# Patient Record
Sex: MALE | Race: WHITE
[De-identification: names, ages, dates, MRNs, and addresses within clinical notes are randomized per-mention and may not be internally consistent; named-entity substitution may affect disease eponyms.]

---

## 2018-05-08 ENCOUNTER — HOSPITAL ENCOUNTER (INPATIENT)
Dept: HOSPITAL 36 - ER | Age: 53
LOS: 3 days | Discharge: NURSING, REHAB OR LONG-TERM CARE | DRG: 721 | End: 2018-05-11
Payer: COMMERCIAL

## 2018-05-08 DIAGNOSIS — E11.622: ICD-10-CM

## 2018-05-08 DIAGNOSIS — Z89.431: ICD-10-CM

## 2018-05-08 DIAGNOSIS — Z79.1: ICD-10-CM

## 2018-05-08 DIAGNOSIS — I11.9: ICD-10-CM

## 2018-05-08 DIAGNOSIS — E11.42: ICD-10-CM

## 2018-05-08 DIAGNOSIS — Z91.19: ICD-10-CM

## 2018-05-08 DIAGNOSIS — D63.8: ICD-10-CM

## 2018-05-08 DIAGNOSIS — Z89.021: ICD-10-CM

## 2018-05-08 DIAGNOSIS — Z79.899: ICD-10-CM

## 2018-05-08 DIAGNOSIS — M86.8X7: ICD-10-CM

## 2018-05-08 DIAGNOSIS — E11.69: ICD-10-CM

## 2018-05-08 DIAGNOSIS — K21.9: ICD-10-CM

## 2018-05-08 DIAGNOSIS — B96.1: ICD-10-CM

## 2018-05-08 DIAGNOSIS — Z79.2: ICD-10-CM

## 2018-05-08 DIAGNOSIS — E11.52: ICD-10-CM

## 2018-05-08 DIAGNOSIS — L03.115: ICD-10-CM
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
## 2018-05-08 DIAGNOSIS — N17.0: ICD-10-CM

## 2018-05-08 DIAGNOSIS — Z16.12: ICD-10-CM
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
## 2018-05-08 DIAGNOSIS — T81.4XXA: Primary | ICD-10-CM

## 2018-05-08 DIAGNOSIS — E11.40: ICD-10-CM

## 2018-05-08 DIAGNOSIS — G89.4: ICD-10-CM

## 2018-05-08 DIAGNOSIS — L97.919: ICD-10-CM

## 2018-05-08 LAB
ALBUMIN SERPL-MCNC: 3.3 GM/DL (ref 4.2–5.5)
ALBUMIN/GLOB SERPL: 0.8 {RATIO} (ref 1–1.8)
ALP SERPL-CCNC: 92 U/L (ref 34–104)
ALT SERPL-CCNC: 9 U/L (ref 7–52)
AST SERPL-CCNC: 13 U/L (ref 13–39)
BASOPHILS # BLD AUTO: 0.1 TH/CUMM (ref 0–0.2)
BASOPHILS NFR BLD AUTO: 1.4 % (ref 0–2)
BILIRUB DIRECT SERPL-MCNC: 0.04 MG/DL (ref 0–0.2)
BILIRUB SERPL-MCNC: 0.2 MG/DL (ref 0.3–1)
CHOLEST SERPL-MCNC: 182 MG/DL (ref ?–200)
EOSINOPHIL # BLD AUTO: 0.2 TH/CMM (ref 0.1–0.4)
EOSINOPHIL NFR BLD AUTO: 3.1 % (ref 0–5)
ERYTHROCYTE [DISTWIDTH] IN BLOOD BY AUTOMATED COUNT: 13.2 % (ref 11.5–20)
GLOBULIN SER-MCNC: 4.3 GM/DL
HCT VFR BLD CALC: 26.9 % (ref 41–60)
HDLC SERPL-MCNC: 20 MG/DL (ref 23–92)
HGB BLD-MCNC: 8.9 GM/DL (ref 12–16)
INR PPP: 1.19 (ref 0.5–1.4)
LIPASE SERPL-CCNC: 34 U/L (ref 11–82)
LYMPHOCYTE AB SER FC-ACNC: 1.5 TH/CMM (ref 1.5–3)
LYMPHOCYTES NFR BLD AUTO: 19.7 % (ref 20–50)
MAGNESIUM SERPL-MCNC: 2.2 MG/DL (ref 1.9–2.7)
MCH RBC QN AUTO: 27 PG (ref 26–30)
MCHC RBC AUTO-ENTMCNC: 33 PG (ref 28–36)
MCV RBC AUTO: 82 FL (ref 80–99)
MONOCYTES # BLD AUTO: 0.6 TH/CMM (ref 0.3–1)
MONOCYTES NFR BLD AUTO: 7.6 % (ref 2–10)
NEUTROPHILS # BLD: 5.4 TH/CMM (ref 1.8–8)
NEUTROPHILS NFR BLD AUTO: 68.2 % (ref 40–80)
PLATELET # BLD: 343 TH/CMM (ref 150–400)
PMV BLD AUTO: 6.3 FL
PROTHROMBIN TIME: 12.5 SECONDS (ref 9.5–11.5)
RBC # BLD AUTO: 3.28 MIL/CMM (ref 4.3–5.7)
TRIGL SERPL-MCNC: 175 MG/DL (ref ?–150)
WBC # BLD AUTO: 7.8 TH/CMM (ref 4.8–10.8)

## 2018-05-08 PROCEDURE — Z7610: HCPCS

## 2018-05-08 PROCEDURE — A9503 TC99M MEDRONATE: HCPCS

## 2018-05-08 PROCEDURE — A4217 STERILE WATER/SALINE, 500 ML: HCPCS

## 2018-05-08 PROCEDURE — X7704: HCPCS
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
## 2018-05-09 VITALS — SYSTOLIC BLOOD PRESSURE: 140 MMHG | DIASTOLIC BLOOD PRESSURE: 84 MMHG

## 2018-05-09 LAB
ALBUMIN SERPL-MCNC: 3 GM/DL (ref 4.2–5.5)
ALBUMIN/GLOB SERPL: 0.8 {RATIO} (ref 1–1.8)
ALP SERPL-CCNC: 85 U/L (ref 34–104)
ALT SERPL-CCNC: 9 U/L (ref 7–52)
ANION GAP SERPL CALC-SCNC: 9.8 MMOL/L (ref 7–16)
AST SERPL-CCNC: 13 U/L (ref 13–39)
BASOPHILS # BLD AUTO: 0.1 TH/CUMM (ref 0–0.2)
BASOPHILS NFR BLD AUTO: 0.9 % (ref 0–2)
BILIRUB SERPL-MCNC: 0.2 MG/DL (ref 0.3–1)
BUN SERPL-MCNC: 26 MG/DL (ref 7–25)
CALCIUM SERPL-MCNC: 8.6 MG/DL (ref 8.6–10.3)
CHLORIDE SERPL-SCNC: 102 MEQ/L (ref 98–107)
CO2 SERPL-SCNC: 25.8 MEQ/L (ref 21–31)
CREAT SERPL-MCNC: 1.6 MG/DL (ref 0.7–1.3)
EOSINOPHIL # BLD AUTO: 0.3 TH/CMM (ref 0.1–0.4)
EOSINOPHIL NFR BLD AUTO: 3.9 % (ref 0–5)
ERYTHROCYTE [DISTWIDTH] IN BLOOD BY AUTOMATED COUNT: 13.1 % (ref 11.5–20)
GLOBULIN SER-MCNC: 4 GM/DL
GLUCOSE SERPL-MCNC: 93 MG/DL (ref 70–105)
HCT VFR BLD CALC: 25.6 % (ref 41–60)
HGB BLD-MCNC: 8.4 GM/DL (ref 12–16)
LYMPHOCYTE AB SER FC-ACNC: 1.7 TH/CMM (ref 1.5–3)
LYMPHOCYTES NFR BLD AUTO: 24.8 % (ref 20–50)
MCH RBC QN AUTO: 27.2 PG (ref 26–30)
MCHC RBC AUTO-ENTMCNC: 33 PG (ref 28–36)
MCV RBC AUTO: 82.2 FL (ref 80–99)
MONOCYTES # BLD AUTO: 0.5 TH/CMM (ref 0.3–1)
MONOCYTES NFR BLD AUTO: 7.8 % (ref 2–10)
NEUTROPHILS # BLD: 4.3 TH/CMM (ref 1.8–8)
NEUTROPHILS NFR BLD AUTO: 62.6 % (ref 40–80)
PLATELET # BLD: 299 TH/CMM (ref 150–400)
PMV BLD AUTO: 6.8 FL
POTASSIUM SERPL-SCNC: 4.6 MEQ/L (ref 3.5–5.1)
RBC # BLD AUTO: 3.11 MIL/CMM (ref 4.3–5.7)
SODIUM SERPL-SCNC: 133 MEQ/L (ref 136–145)
WBC # BLD AUTO: 6.9 TH/CMM (ref 4.8–10.8)

## 2018-05-09 RX ADMIN — MORPHINE SULFATE PRN MG: 4 INJECTION, SOLUTION INTRAMUSCULAR; INTRAVENOUS at 10:22

## 2018-05-09 RX ADMIN — HYDROCODONE BITARTRATE AND ACETAMINOPHEN PRN TAB: 5; 325 TABLET ORAL at 12:00

## 2018-05-09 RX ADMIN — INSULIN ASPART SCH: 100 INJECTION, SOLUTION INTRAVENOUS; SUBCUTANEOUS at 21:15

## 2018-05-09 RX ADMIN — MORPHINE SULFATE PRN MG: 4 INJECTION, SOLUTION INTRAMUSCULAR; INTRAVENOUS at 21:16

## 2018-05-09 RX ADMIN — INSULIN ASPART SCH: 100 INJECTION, SOLUTION INTRAVENOUS; SUBCUTANEOUS at 14:40

## 2018-05-09 RX ADMIN — MORPHINE SULFATE PRN MG: 4 INJECTION, SOLUTION INTRAMUSCULAR; INTRAVENOUS at 16:57

## 2018-05-09 RX ADMIN — HYDROCODONE BITARTRATE AND ACETAMINOPHEN PRN TAB: 5; 325 TABLET ORAL at 01:02

## 2018-05-09 RX ADMIN — INSULIN ASPART SCH: 100 INJECTION, SOLUTION INTRAVENOUS; SUBCUTANEOUS at 09:51

## 2018-05-09 RX ADMIN — HYDROCODONE BITARTRATE AND ACETAMINOPHEN PRN TAB: 5; 325 TABLET ORAL at 20:56

## 2018-05-09 NOTE — DIAGNOSTIC IMAGING REPORT
CHEST X-RAY: AP view



INDICATION: Pneumonia



COMPARISON: None



FINDINGS: Left PICC line is seen with tip in SVC There is no focal

consolidation or pleural effusions .  The heart size is borderline

prominent. The osseous structures demonstrate no acute abnormalities.



IMPRESSION: 



No focal airspace consolidation identified.

## 2018-05-09 NOTE — HISTORY & PHYSICAL
ADMIT DATE:  05/09/2018



CHIEF COMPLAINT:  MDRO infection of the right foot.



HISTORY OF PRESENT ILLNESS:  The patient is a 53-year-old male.  He has history

of being in multiple hospitals.  He originally presented to [facility] in

April 2018 with complaint of fevers, chills and food poisoning.  He had multiple

diarrhea episodes as well.  He was then transferred to [facility].  He

was found to have multiple diabetic ulcers of the right foot along with diabetic

ulceration.  He also is status post right foot amputation in middle of January

at [facility].  He had a gangrene.  He initially was supposed to have

just the right foot amputated.  Due to the gangrene, he had partial foot

amputation.  He then was transferred to skilled nursing facility.  He now

presents here with MDRO infection of the right foot.



PAST MEDICAL HISTORY:  Significant for right middle finger amputation and right

foot amputation and he also has a history of cellulitis, diabetes, chronic pain,

renal failure and anemia.



SOCIAL HISTORY:  Denies any alcohol, tobacco or drug abuse.



FAMILY HISTORY:  Noncontributory.



ALLERGIES:  No known drug allergies.



SURGICAL HISTORY:  As mentioned above.



REVIEW OF SYSTEMS:

GENERAL:  Positive recent fatigue, decreased appetite.

HEENT:  No recent head traumas, change in vision, taste, hearing, or smell. 

Oral:  No recent pain or discharge.

NECK:  No recent tracheal deviation.

ABDOMEN:  No recent pain or distension.

SKIN:  Positive for right foot wound that is not healing.

PSYCHIATRIC:  No history of psychosis or hallucinations.

NEUROLOGIC:  No history of stroke or seizure.

MUSCULOSKELETAL:  Positive for unsteady gait.

ENDOCRINE:  He has history of diabetes.

HEMATOLOGY AND ONCOLOGY:  He has history of anemia.



PHYSICAL EXAMINATION:

VITAL SIGNS:  Temperature is 97.5 degrees, heart rate is 93, respirations are

18, blood pressure 140/84.  Currently, no pain.

GENERAL:  No acute distress, awake, alert, but he has been noncompliant with

_____.

HEENT:  No acute issues.

NECK:  Trachea is in midline.

CARDIOVASCULAR:  Regular rate and rhythm.

ABDOMEN:  Nontender, nondistended.

SKIN:  He has erythema, inflammation of the right lower extremity.

PSYCHIATRIC:  No history of psychosis or hallucinations.

NEUROLOGIC:  Stable.

MUSCULOSKELETAL:  Decreased muscle tone in lower extremities.



LABORATORY DATA:  Sodium 133, potassium 4.6, chloride 102, bicarbonate 25.8, BUN

26, creatinine 1.6.  Lipase is 20.  Albumin is 3.0.  White count is 6.9,

hemoglobin is 8.4 and a chest x-ray does not show any acute abnormalities.



ASSESSMENT:

1.  Multidrug resistant organisms right lower extremity cellulitis.

2.  Diabetes mellitus.

3.  Right lower extremity diabetic ulcer.

4.  Chronic pain.

5.  Vasomotor nephropathy.

6.  Anemia of chronic illness.



PLAN:  Based on the wound culture susceptibility report, I have started on

amikacin.  Dr. Tolentino has been consulted.  Continue fingerstick blood sugar and

regular insulin sliding scale.  The patient is status post course of IV Zosyn at

the previous hospital.  He also has history of partial right foot amputation in

January 2018 at [facility].  The patient has also dependence on pain

medication.





DD: 05/09/2018 09:29

DT: 05/09/2018 11:08

JOB# [id number]  [id number]

## 2018-05-09 NOTE — DIAGNOSTIC IMAGING REPORT
Bilateral lower extremity arterial Doppler study



HISTORY: Peripheral vascular disease, history of right foot amputation



COMPARISON: None



Technique: Longitudinal and transverse sonographic images of the

bilateral lower extremity arteries were obtained with doppler analysis.



FINDINGS: 



Exam of the right side demonstrates mild to moderate atherosclerotic

vascular disease.  There is biphasic waveforms in the right tibialis

anterior and right dorsalis pedis arteries.  The remaining arteries

demonstrate triphasic flow.



Right KYRIE 1.2



Exam of the left side demonstrates mild to moderate atherosclerotic

vascular disease with generalized triphasic flow.

 noted.  



Left KYRIE was not able to be performed due to PICC line along the left

upper extremity.



IMPRESSION:



Mild to moderate generalized atherosclerotic vascular disease.  No

evidence of occlusion.

## 2018-05-09 NOTE — ER PHYSICIAN DOCUMENTATION
DATE OF SERVICE:  05/08/2018



ADDENDUM



I just got the medication list.  He is not allergic to any medication that is

being verified.  The patient is getting insulin human regular, Novolin subq

b.i.d. depending upon the situation plus Tylenol 2 tablets p.o. every 6 hourly. 

There is hydrocodone with acetaminophen, Norco 5/325 mg, metformin 500 mg p.o.

b.i.d., Lasix 1 tablet daily, the dose is not written, most likely it is 40 mg,

gabapentin, Neurontin 100 mg p.o. 3 times a day for peripheral neuropathy, iron

sulfate 1 tablet p.o. daily, albuterol, there is a DuoNeb 1 unit inhalation

every 6 hourly, docusate 1 tablet daily, aspirin one tablet daily and amino

acid/protein hydrolysis Pro-Stat maximum liquid 30 mL per day.





DD: 05/08/2018 17:53

DT: 05/08/2018 19:54

JOB# [id number]  [id number]

## 2018-05-09 NOTE — CONSULTATION
Consult Note





- Consult Note


Service Date: 05/09/18


Referring Physician: Melvina Juárez


Consult Note: 


PHYSICIAN Consultation Note:





Date of Admission: 05/08/18





Purpose of Consultation: 





Chief Complaint: Patient RENE ARGUETA was admitted to location [facility] 

[facility] with RT LOWER EXTREMITY CELLULITIS.





History of Present Illness:


53-year-old male with a past medical history of diabetes mellitus type 2, renal 

failure, Anemia of chronic disease had developed gangrene of 2 toes off the 

right foot.  Patient also has history of amputation of fifth toe because of 

gangrene in the past.  In January of this year, he was admitted to [facility] for gangrene of 2 toes of right foot.  TMA of right foot was performed 

on January 18, 2018.  He was discharged from nursing facility.  He stated that 

he had been treated for osteomyelitis by vancomycin IV for long duration.  He 

is PICC line was called and had a new PICC line placed in left arm and it is 

therefore last 5 weeks.  There is no cell fever no chills.  No sign of any 

infection in the PICC line site.  Unfortunately, he is right TMA wound got 

worse.  Now he has very large wound distally.  Wound culture performed and 

revealed Acinetobacter baumenii complex and Klebsiella pneumoniae.  Patient was 

brought to the ER and admitted for further antibiotic treatment.  Amikacin was 

started.  ID consult was called for further antibiotic management.





Past Medical History: diabetes mellitus type 2, renal failure, Anemia of 

chronic disease had developed gangrene of 2 toes off the right foot.





Allergies











Allergy/AdvReac Type Severity Reaction Status Date / Time


 


No Known Allergies Allergy   Verified 05/08/18 16:17








 Vital Signs











Temp  96.5 F   05/09/18 04:00


 


Pulse  93   05/09/18 04:00


 


Resp  17   05/09/18 09:09


 


BP  140/84   05/09/18 05:17


 


Pulse Ox  96   05/09/18 04:00








 Intake & Output











 05/08/18 05/09/18 05/09/18





 18:59 06:59 18:59


 


Intake Total  250 


 


Balance  250 


 


Weight (lbs)  106.141 kg 


 


Intake:   


 


  Oral  250 


 


Other:   


 


  # Voids  1 


 


  # Bowel Movements  0 


 


  Weight Source  Bedscale 








 Laboratory Results - last 24 hr











  05/09/18 05/09/18 05/09/18





  05:35 05:35 05:35


 


WBC  6.9  


 


RBC  3.11 L  


 


Hgb  8.4 L  


 


Hct  25.6 L  


 


MCV  82.2  


 


MCH  27.2  


 


MCHC Differential  33.0  


 


RDW  13.1  


 


Plt Count  299  


 


MPV  6.8  


 


Neutrophils %  62.6  


 


Lymphocytes %  24.8  


 


Monocytes %  7.8  


 


Eosinophils %  3.9  


 


Basophils %  0.9  


 


Sodium   133 L 


 


Potassium   4.6 


 


Chloride   102 


 


Carbon Dioxide   25.8 


 


Anion Gap   9.8 


 


BUN   26 H 


 


Creatinine   1.6 H 


 


Est GFR ( Amer)   58.0 


 


Est GFR (Non-Af Amer)   47.9 


 


BUN/Creatinine Ratio   16.3 


 


Glucose   93 


 


Calcium   8.6 


 


Magnesium    2.1


 


Total Bilirubin   0.2 L 


 


AST   13 


 


ALT   9 


 


Alkaline Phosphatase   85 


 


Total Protein   7.0 


 


Albumin   3.0 L 


 


Globulin   4.0 


 


Albumin/Globulin Ratio   0.8 L 








Home Medication











 Medication  Instructions  Recorded  Type


 


Acetaminophen 2 tab PO Q6H PRN 05/08/18 History


 


Albuterol/Ipratropium Neb [Duoneb 1 unit INH Q6H PRN 05/08/18 History





Neb]   


 


Amino Acids/Protein Hydrolys 30 ml PO DAILY 05/08/18 History





[Pro-Stat Max Liquid]   


 


Aspirin [Adult Low Dose Aspirin EC] 81 mg PO DAILY 05/08/18 History


 


Docusate Sodium 1 tab PO DAILY 05/08/18 History


 


Ferrous Sulfate [Iron] 1 tab PO DAILY 05/08/18 History


 


Furosemide [Lasix] 1 tab PO DAILY 05/08/18 History


 


Gabapentin [Neurontin*] 100 mg PO TID 05/08/18 History


 


Hydrocodone/Acetaminophen [Norco 1 tab PO Q6H PRN 05/08/18 History





325 mg-5 mg*]   


 


Insulin Human Regular [NovoLIN R*] 0 unit SUBQ BIDAC 05/08/18 History


 


Metformin HCl [Metformin HCl ER] 500 mg PO BID 05/08/18 History








Current Medications











Generic Name Dose Route Start Last Admin





  Trade Name Freq  PRN Reason Stop Dose Admin


 


Acetaminophen  650 mg  05/09/18 09:17  





  Tylenol  PO  07/08/18 09:16  





  Q6H PRN   





  HEADACHE/TEMP ABOVE 100F   


 


Acetaminophen/Hydrocodone Bitart  1 tab  05/08/18 23:39  05/09/18 01:02





  Norco 5mg/325mg  PO  07/07/18 23:38  1 tab





  Q6H PRN   Administration





  Moderate to Severe Pain   


 


Gabapentin  100 mg  05/08/18 23:45  05/09/18 09:25





  Neurontin  PO  07/07/18 23:44  100 mg





  TID ROSA MARIA   Administration


 


Magnesium Sulfate  2 gm in 50 mls @ 25 mls/hr  05/09/18 09:17  





  Magnesium Sulfate Premix  IV  07/08/18 09:16  





  DAILY PRN   





  Magnesium level less than 1.6   


 


Tigecycline 50 mg/ Sodium  100 mls @ 100 mls/hr  05/09/18 21:00  





  Chloride  IV  07/08/18 20:59  





  Q12H ROSA MARIA   


 


Potassium Chloride  40 meq in 200 mls @ 50 mls/hr  05/10/18 10:00  





  Potassium Chloride  IV  07/09/18 09:59  





  DAILY PRN   





  K LEVEL BELOW 3.2   


 


Insulin Aspart  0 units  05/09/18 07:30  05/09/18 09:51





  Novolog Insulin Sliding Scale  SUBQ  07/08/18 07:29  Not Given





  ACHS ROSA MARIA   





  Protocol   


 


Lorazepam  1 mg  05/09/18 09:17  





  Ativan  IVP  07/08/18 09:16  





  Q4HR PRN   





  Anxiety   





  Protocol   


 


Magnesium Oxide  400 mg  05/09/18 09:17  





  Mag-Oxide  PO  07/08/18 09:16  





  BID PRN   





  Mg less than 1.9   


 


Morphine Sulfate  1 mg  05/09/18 09:17  05/09/18 10:22





  Morphine  IVP  07/08/18 09:16  1 mg





  Q4HR PRN   Administration





  Severe Pain   


 


Ondansetron HCl  4 mg  05/09/18 09:17  





  Zofran  IVP  07/08/18 09:16  





  Q6H PRN   





  Nausea / Vomiting   


 


Potassium Chloride  40 meq  05/09/18 09:17  





  Klor-Con  PO  07/08/18 09:16  





  DAILY PRN   





  k level less than 3.5   


 


Zolpidem Tartrate  10 mg  05/08/18 23:43  05/09/18 02:02





  Ambien  PO  07/07/18 23:42  10 mg





  HS PRN   Administration





  Insomnia   


 


Zolpidem Tartrate  10 mg  05/09/18 09:17  





  Ambien  PO  07/08/18 09:16  





  HS PRN   





  Insomnia   








Review of Systems:


A 12 point ROS was reviewed with the pertinent positive and negatives noted in 

the HPI.





Social History





Smoking Status                   Current every day smoker





Family Medical History





Family Medical History                                     Start:  05/08/18 22:

36


Freq:   ONCE                                               Status: Active      

  


 Document     05/08/18 22:47  RSANDRES  (Rec: 05/09/18 06:33  CORTES [id number]

)


 Family Medical History


     Mother


      History Unknown                            Yes





Physical Exam:





General: Comfortable, not in acute distress. 





HEENT: Head: Normocephalic,Atraumatic.  Oral cavity: Moist, pink tongue.  Eyes: 

Pallor is present.  No icterus 





Neck: Supple, no JVD.  No use of accessory muscles.





Cardio: S1 and S2 within normal limits regular rhythm no murmur or gallop.





Respiratory: CTAP.





Abdominal: Soft, nontender nondistended bowel sounds present





Genital/Urinary: Deferred





Extremities: No cyanosis no clubbing no edema right TMA wound open distally 

with well-defined border.





Neurological: 


Alert, awake, oriented 3.





Assessment: 


1.  Right TMA wound infection.  Wound culture grew MDR Acinetobacter baumenii 

complex and Klebsiella pneumoniae


2.  Diabetes mellitus type 2.


3.  Diabetic neuropathy.


4.  Rule out PAD.











Plan: 


Change antibiotic to Tygacil.


Wound care.


ESR CRP


Three-phase bone scan


Arterial study.





Thank you, Dr. Juárez for involving me in taking care of this patient














Signed,





Obdulio Tolentino M.D.


05/09/[id number]

## 2018-05-10 LAB
HBA1C MFR BLD: 5.5 % (ref 4–6)
HGB BLD-MCNC: 9 G/DL (ref 4–35)

## 2018-05-10 RX ADMIN — MORPHINE SULFATE PRN MG: 4 INJECTION, SOLUTION INTRAMUSCULAR; INTRAVENOUS at 14:23

## 2018-05-10 RX ADMIN — INSULIN ASPART SCH: 100 INJECTION, SOLUTION INTRAVENOUS; SUBCUTANEOUS at 20:43

## 2018-05-10 RX ADMIN — Medication SCH EACH: at 08:29

## 2018-05-10 RX ADMIN — INSULIN ASPART SCH: 100 INJECTION, SOLUTION INTRAVENOUS; SUBCUTANEOUS at 18:29

## 2018-05-10 RX ADMIN — HYDROCODONE BITARTRATE AND ACETAMINOPHEN PRN TAB: 5; 325 TABLET ORAL at 20:22

## 2018-05-10 RX ADMIN — MORPHINE SULFATE PRN MG: 4 INJECTION, SOLUTION INTRAMUSCULAR; INTRAVENOUS at 20:28

## 2018-05-10 RX ADMIN — INSULIN ASPART SCH: 100 INJECTION, SOLUTION INTRAVENOUS; SUBCUTANEOUS at 12:47

## 2018-05-10 RX ADMIN — HYDROCODONE BITARTRATE AND ACETAMINOPHEN PRN TAB: 5; 325 TABLET ORAL at 08:29

## 2018-05-10 RX ADMIN — MORPHINE SULFATE PRN MG: 4 INJECTION, SOLUTION INTRAMUSCULAR; INTRAVENOUS at 05:56

## 2018-05-10 RX ADMIN — INSULIN ASPART SCH: 100 INJECTION, SOLUTION INTRAVENOUS; SUBCUTANEOUS at 12:50

## 2018-05-10 RX ADMIN — INSULIN ASPART SCH: 100 INJECTION, SOLUTION INTRAVENOUS; SUBCUTANEOUS at 12:49

## 2018-05-10 NOTE — GENERAL PROGRESS NOTE
Subjective





- Review of Systems


Service Date: 05/10/18


Subjective: 





Pt seen and evaluated. On IV Amikacin. No n,v,d or cp. No falls or sz.


Pain in controlled.


Status post venous doppler US of LE.


No dysuria.








Objective





- Results


Result Diagrams: 


 05/09/18 05:35





 05/09/18 05:35


Recent Labs: 


 Laboratory Last Values











WBC  6.9 Th/cmm (4.8-10.8)   05/09/18  05:35    


 


RBC  3.11 Mil/cmm (4.30-5.70)  L  05/09/18  05:35    


 


Hgb  8.4 gm/dL (12-16)  L  05/09/18  05:35    


 


Hct  25.6 % (41.0-60)  L  05/09/18  05:35    


 


MCV  82.2 fl (80-99)   05/09/18  05:35    


 


MCH  27.2 pg (26.0-30.0)   05/09/18  05:35    


 


MCHC Differential  33.0 pg (28.0-36.0)   05/09/18  05:35    


 


RDW  13.1 % (11.5-20.0)   05/09/18  05:35    


 


Plt Count  299 Th/cmm (150-400)   05/09/18  05:35    


 


MPV  6.8 fl  05/09/18  05:35    


 


Neutrophils %  62.6 % (40.0-80.0)   05/09/18  05:35    


 


Lymphocytes %  24.8 % (20.0-50.0)   05/09/18  05:35    


 


Monocytes %  7.8 % (2.0-10.0)   05/09/18  05:35    


 


Eosinophils %  3.9 % (0.0-5.0)   05/09/18  05:35    


 


Basophils %  0.9 % (0.0-2.0)   05/09/18  05:35    


 


PT  12.5 SECONDS (9.5-11.5)  H  05/08/18  17:08    


 


INR  1.19  (0.5-1.4)   05/08/18  17:08    


 


Sodium  133 mEq/L (136-145)  L  05/09/18  05:35    


 


Potassium  4.6 mEq/L (3.5-5.1)   05/09/18  05:35    


 


Chloride  102 mEq/L ()   05/09/18  05:35    


 


Carbon Dioxide  25.8 mEq/L (21.0-31.0)   05/09/18  05:35    


 


Anion Gap  9.8  (7.0-16.0)   05/09/18  05:35    


 


BUN  26 mg/dL (7-25)  H  05/09/18  05:35    


 


Creatinine  1.6 mg/dL (0.7-1.3)  H  05/09/18  05:35    


 


Est GFR ( Amer)  58.0 ml/min (>90)   05/09/18  05:35    


 


Est GFR (Non-Af Amer)  47.9 ml/min  05/09/18  05:35    


 


BUN/Creatinine Ratio  16.3   05/09/18  05:35    


 


Glucose  93 mg/dL ()   05/09/18  05:35    


 


POC Glucose  143 MG/DL (70 - 105)  H  05/10/18  05:57    


 


Whole Bld Lactic Acid  0.84 mmol/L (0.60-1.99)   05/08/18  17:15    


 


Calcium  8.6 mg/dL (8.6-10.3)   05/09/18  05:35    


 


Magnesium  2.1 mg/dL (1.9-2.7)   05/09/18  05:35    


 


Total Bilirubin  0.2 mg/dL (0.3-1.0)  L  05/09/18  05:35    


 


Direct Bilirubin  0.04 mg/dL (0.0-0.2)   05/08/18  17:08    


 


AST  13 U/L (13-39)   05/09/18  05:35    


 


ALT  9 U/L (7-52)   05/09/18  05:35    


 


Alkaline Phosphatase  85 U/L ()   05/09/18  05:35    


 


Troponin I  0.03 ng/mL (0.01-0.05)   05/08/18  17:08    


 


C-Reactive Protein  5.6 mg/dL (0.0-0.9)  H  05/08/18  17:08    


 


B-Natriuretic Peptide  240.0 pg/mL (5.0-100.0)  H  05/08/18  17:08    


 


Total Protein  7.0 gm/dL (6.0-8.3)   05/09/18  05:35    


 


Albumin  3.0 gm/dL (4.2-5.5)  L  05/09/18  05:35    


 


Globulin  4.0 gm/dL  05/09/18  05:35    


 


Albumin/Globulin Ratio  0.8  (1.0-1.8)  L  05/09/18  05:35    


 


Triglycerides  175 mg/dL (<150)  H  05/08/18  17:15    


 


Cholesterol  182 mg/dL (<200)   05/08/18  17:15    


 


LDL Cholesterol Direct  125 mg/dL ()   05/08/18  17:15    


 


HDL Cholesterol  20 mg/dL (23-92)  L  05/08/18  17:15    


 


Lipase  34 U/L (11-82)   05/08/18  17:15    


 


TSH  0.78 uIU/ml (0.34-5.60)   05/08/18  17:08    














- Physical Exam


Vitals and I&O: 


 Vital Signs











Temp  97.2 F   05/10/18 00:00


 


Pulse  79   05/10/18 00:00


 


Resp  20   05/10/18 00:00


 


BP  119/69   05/10/18 00:00


 


Pulse Ox  95   05/10/18 00:00








 Intake & Output











 05/09/18 05/10/18 05/10/18





 18:59 06:59 18:59


 


Intake Total 300 350 


 


Balance 300 350 


 


Weight (lbs) 106.141 kg 106.141 kg 106.141 kg


 


Intake:   


 


  Intake, IV Amount  100 


 


    Tigecycline 50 mg In  100 





    Sodium Chloride 0.9% 100   





    ml @ 100 mls/hr IV Q12H   





    [facility] Rx#:[id number]   


 


  Oral 300 250 


 


Other:   


 


  # Voids 3 2 


 


  # Bowel Movements 1  


 


  Weight Source Bedscale Bedscale Estimated











Active Medications: 


Current Medications





Acetaminophen (Tylenol)  650 mg PO Q6H PRN


   PRN Reason: HEADACHE/TEMP ABOVE 100F


   Stop: 07/08/18 09:16


Acetaminophen/Hydrocodone Bitart (Norco 5mg/325mg)  1 tab PO Q6H PRN


   PRN Reason: Moderate to Severe Pain


   Stop: 07/07/18 23:38


   Last Admin: 05/10/18 08:29 Dose:  1 tab


Gabapentin (Neurontin)  100 mg PO TID [facility]


   Stop: 07/07/18 23:44


   Last Admin: 05/10/18 08:29 Dose:  100 mg


Magnesium Sulfate (Magnesium Sulfate Premix)  2 gm in 50 mls @ 25 mls/hr IV 

DAILY PRN


   PRN Reason: Magnesium level less than 1.6


   Stop: 07/08/18 09:16


Tigecycline 50 mg/ Sodium (Chloride)  100 mls @ 100 mls/hr IV Q12H [facility]


   Stop: 07/08/18 20:59


   Last Admin: 05/10/18 08:28 Dose:  100 mls/hr


Potassium Chloride (Potassium Chloride)  40 meq in 200 mls @ 50 mls/hr IV DAILY 

PRN


   PRN Reason: K LEVEL BELOW 3.2


   Stop: 07/09/18 09:59


Insulin Aspart (Novolog Insulin Sliding Scale)  0 units SUBQ ACHS [facility]


   PRN Reason: Protocol


   Stop: 07/08/18 07:29


   Last Admin: 05/09/18 21:15 Dose:  Not Given


Lactobacillus Rhamnosus (Culturelle 15b)  1 each PO DAILY [facility]


   Stop: 07/09/18 08:59


   Last Admin: 05/10/18 08:29 Dose:  1 each


Lorazepam (Ativan)  1 mg IVP Q4HR PRN; Protocol


   PRN Reason: Anxiety


   Stop: 07/08/18 09:16


Magnesium Oxide (Mag-Oxide)  400 mg PO BID PRN


   PRN Reason: Mg less than 1.9


   Stop: 07/08/18 09:16


Morphine Sulfate (Morphine)  1 mg IVP Q4HR PRN


   PRN Reason: Severe Pain


   Stop: 07/08/18 09:16


   Last Admin: 05/10/18 05:56 Dose:  1 mg


Ondansetron HCl (Zofran)  4 mg IVP Q6H PRN


   PRN Reason: Nausea / Vomiting


   Stop: 07/08/18 09:16


   Last Admin: 05/09/18 14:23 Dose:  4 mg


Potassium Chloride (Klor-Con)  40 meq PO DAILY PRN


   PRN Reason: k level less than 3.5


   Stop: 07/08/18 09:16


Sertraline HCl (Zoloft)  25 mg PO DAILY [facility]


   PRN Reason: Protocol


   Stop: 07/09/18 08:59


   Last Admin: 05/10/18 08:29 Dose:  25 mg


Zolpidem Tartrate (Ambien)  10 mg PO HS PRN


   PRN Reason: Insomnia


   Stop: 07/07/18 23:42


   Last Admin: 05/09/18 20:57 Dose:  10 mg


Zolpidem Tartrate (Ambien)  10 mg PO HS PRN


   PRN Reason: Insomnia


   Stop: 07/08/18 09:16








General: Alert, Oriented x3, Cooperative


HEENT: Atraumatic, PERRLA, EOMI


Neck: Supple, no JVD, no Thyromegaly


Cardiovascular: Regular rate, Normal S1, Normal S2


Lungs: Clear to auscultation





Assessment/Plan





- Assessment


Assessment: 





MDRO R LE cellulitis/diabetic ulcer


DM


Ch Pain


VMN


Anemia of ch ill


Ch pain syn


Status post partial amputation of R foot








- Plan


Plan: 





Pt is on IV Amikacin


Bone scan has been ordered by ID to rule out osteomyelitis.


FU on results.


Status post arterial doppler of LE on 5/9/18-no occlusion.

## 2018-05-11 RX ADMIN — MORPHINE SULFATE PRN MG: 4 INJECTION, SOLUTION INTRAMUSCULAR; INTRAVENOUS at 15:13

## 2018-05-11 RX ADMIN — INSULIN ASPART SCH: 100 INJECTION, SOLUTION INTRAVENOUS; SUBCUTANEOUS at 12:36

## 2018-05-11 RX ADMIN — MORPHINE SULFATE PRN MG: 4 INJECTION, SOLUTION INTRAMUSCULAR; INTRAVENOUS at 06:29

## 2018-05-11 RX ADMIN — MORPHINE SULFATE PRN MG: 4 INJECTION, SOLUTION INTRAMUSCULAR; INTRAVENOUS at 11:21

## 2018-05-11 RX ADMIN — INSULIN ASPART SCH: 100 INJECTION, SOLUTION INTRAVENOUS; SUBCUTANEOUS at 17:58

## 2018-05-11 RX ADMIN — Medication SCH EACH: at 09:28

## 2018-05-11 RX ADMIN — INSULIN ASPART SCH: 100 INJECTION, SOLUTION INTRAVENOUS; SUBCUTANEOUS at 08:08

## 2018-05-11 NOTE — DIAGNOSTIC IMAGING REPORT
Right foot (3 views)



HISTORY: Amputation, osteomyelitis



The exam demonstrates amputation distal to the presence of bones. 

Generalized soft tissue swelling.  Irregularity noted about the distal

margins of the residual bases of the proximal phalanges.  In view of the

corresponding changes noted on radionuclide 3 phase bone scan of

5/10/2018, the findings are consistent with osteomyelitis.



IMPRESSION: Status post amputation along with bony changes consistent

with osteomyelitis in view of the abnormal findings noted on the

concurrent radionuclide 3 phase bone scan.

## 2018-05-11 NOTE — DISCHARGE SUMMARY
DATE OF DISCHARGE:  05/11/2018



DATE OF DISCHARGE:  To skilled nursing facility, 05/11/2018.



CAUSE OF ADMISSION:  The patient is a 53-year-old male.  He has history of being

in multiple hospitals.  He recently presented to [facility] in 04/2018 with

complaint of fever, chills and food poisoning.  He had multiple diarrhea

episodes.  He was then transferred to [facility].  He was found to have

multiple diabetic ulcer of the right foot along with diabetic ulceration.  He is

status post right foot TMA in January at [facility].  He had a

gangrene.  He initially was supposed to have just the right fifth toe amputated

due to gangrene.  He had partial foot amputation.  He has history of being

noncompliant.  He was then transferred to skilled nursing facility.  He was

found to have MDRO infection of the foot.



ADMITTING DIAGNOSES:

1.  Multidrug resistant organisms right lower extremity cellulitis.

2.  Diabetes mellitus.

3.  Right lower extremity diabetic ulcer.

4.  Chronic pain.

5.  Vasomotor nephropathy.

6.  Anemia of chronic illness.



DISCHARGE DIAGNOSES:

1.  Multidrug resistant organisms right lower extremity cellulitis.

2.  Diabetes mellitus.

3.  Right lower extremity diabetic ulcer.

4.  Chronic pain.

5.  Vasomotor nephropathy.

6.  Anemia of chronic illness.

7.  Right foot multidrug resistant organism, Acinetobacter baumannii complex and

Klebsiella pneumoniae infection.



SUMMARY OF HOSPITAL COURSE:  ID has been seeing Dr. Tolentino who is recommending 6

weeks of IV Tegretol.  He has been accepted at skilled nursing facility. 

Physical exam and labs as charted.



PROGNOSIS:  Fair.



ACTIVITY:  As tolerated.



DIET:  ADA.



MEDICATIONS:  All medication reviewed.





DD: 05/11/2018 15:16

DT: 05/11/2018 17:27

[facility]# [id number]  [id number]

## 2018-05-11 NOTE — INFECTIOUS DISEASE PROG NOTE
Infectious Disease Subjective





- Review of Systems


Service Date: 05/11/18


Subjective: 





There is no new change, no fever.





Infectious Disease Objective





- Results


Result Diagrams: 


 05/09/18 05:35





 05/09/18 05:35


Recent Labs: 


 Laboratory Last Values











WBC  6.9 Th/cmm (4.8-10.8)   05/09/18  05:35    


 


RBC  3.11 Mil/cmm (4.30-5.70)  L  05/09/18  05:35    


 


Hgb  8.4 gm/dL (12-16)  L  05/09/18  05:35    


 


Hct  25.6 % (41.0-60)  L  05/09/18  05:35    


 


MCV  82.2 fl (80-99)   05/09/18  05:35    


 


MCH  27.2 pg (26.0-30.0)   05/09/18  05:35    


 


MCHC Differential  33.0 pg (28.0-36.0)   05/09/18  05:35    


 


RDW  13.1 % (11.5-20.0)   05/09/18  05:35    


 


Plt Count  299 Th/cmm (150-400)   05/09/18  05:35    


 


MPV  6.8 fl  05/09/18  05:35    


 


Neutrophils %  62.6 % (40.0-80.0)   05/09/18  05:35    


 


Lymphocytes %  24.8 % (20.0-50.0)   05/09/18  05:35    


 


Monocytes %  7.8 % (2.0-10.0)   05/09/18  05:35    


 


Eosinophils %  3.9 % (0.0-5.0)   05/09/18  05:35    


 


Basophils %  0.9 % (0.0-2.0)   05/09/18  05:35    


 


PT  12.5 SECONDS (9.5-11.5)  H  05/08/18  17:08    


 


INR  1.19  (0.5-1.4)   05/08/18  17:08    


 


Sodium  133 mEq/L (136-145)  L  05/09/18  05:35    


 


Potassium  4.6 mEq/L (3.5-5.1)   05/09/18  05:35    


 


Chloride  102 mEq/L ()   05/09/18  05:35    


 


Carbon Dioxide  25.8 mEq/L (21.0-31.0)   05/09/18  05:35    


 


Anion Gap  9.8  (7.0-16.0)   05/09/18  05:35    


 


BUN  26 mg/dL (7-25)  H  05/09/18  05:35    


 


Creatinine  1.6 mg/dL (0.7-1.3)  H  05/09/18  05:35    


 


Est GFR ( Amer)  58.0 ml/min (>90)   05/09/18  05:35    


 


Est GFR (Non-Af Amer)  47.9 ml/min  05/09/18  05:35    


 


BUN/Creatinine Ratio  16.3   05/09/18  05:35    


 


Glucose  93 mg/dL ()   05/09/18  05:35    


 


POC Glucose  94 MG/DL (70 - 105)   05/11/18  06:33    


 


Hemoglobin A1c %  5.5 % (4.0-6.0)   05/08/18  17:08    


 


Whole Bld Lactic Acid  0.84 mmol/L (0.60-1.99)   05/08/18  17:15    


 


Calcium  8.6 mg/dL (8.6-10.3)   05/09/18  05:35    


 


Magnesium  2.1 mg/dL (1.9-2.7)   05/09/18  05:35    


 


Total Bilirubin  0.2 mg/dL (0.3-1.0)  L  05/09/18  05:35    


 


Direct Bilirubin  0.04 mg/dL (0.0-0.2)   05/08/18  17:08    


 


AST  13 U/L (13-39)   05/09/18  05:35    


 


ALT  9 U/L (7-52)   05/09/18  05:35    


 


Alkaline Phosphatase  85 U/L ()   05/09/18  05:35    


 


Troponin I  0.03 ng/mL (0.01-0.05)   05/08/18  17:08    


 


C-Reactive Protein  5.6 mg/dL (0.0-0.9)  H  05/08/18  17:08    


 


B-Natriuretic Peptide  240.0 pg/mL (5.0-100.0)  H  05/08/18  17:08    


 


Total Protein  7.0 gm/dL (6.0-8.3)   05/09/18  05:35    


 


Albumin  3.0 gm/dL (4.2-5.5)  L  05/09/18  05:35    


 


Globulin  4.0 gm/dL  05/09/18  05:35    


 


Albumin/Globulin Ratio  0.8  (1.0-1.8)  L  05/09/18  05:35    


 


Triglycerides  175 mg/dL (<150)  H  05/08/18  17:15    


 


Cholesterol  182 mg/dL (<200)   05/08/18  17:15    


 


LDL Cholesterol Direct  125 mg/dL ()   05/08/18  17:15    


 


HDL Cholesterol  20 mg/dL (23-92)  L  05/08/18  17:15    


 


Lipase  34 U/L (11-82)   05/08/18  17:15    


 


Vitamin C  0.3 mg/dL (0.2-2.0)   05/08/18  17:08    


 


TSH  0.78 uIU/ml (0.34-5.60)   05/08/18  17:08    














- Physical Exam


Vitals and I&O: 


 Vital Signs











Temp  98.7 F   05/11/18 04:00


 


Pulse  86   05/11/18 04:00


 


Resp  16   05/11/18 08:00


 


BP  136/74   05/11/18 04:00


 


Pulse Ox  98   05/10/18 08:00








 Intake & Output











 05/10/18 05/11/18 05/11/18





 18:59 06:59 18:59


 


Intake Total 100 700 


 


Output Total  0 


 


Balance 100 700 


 


Weight (lbs) 106.141 kg 106.141 kg 


 


Intake:   


 


  Intake, IV Amount 100 100 


 


    Tigecycline 50 mg In 100 100 





    Sodium Chloride 0.9% 100   





    ml @ 100 mls/hr IV Q12H   





    [facility] Rx#:[id number]   


 


  Oral  600 


 


Output:   


 


  Stool  0 


 


Other:   


 


  # Voids  3 


 


  # Bowel Movements  0 


 


  Weight Source Estimated Estimated 











Active Medications: 


Current Medications





Acetaminophen (Tylenol)  650 mg PO Q6H PRN


   PRN Reason: HEADACHE/TEMP ABOVE 100F


   Stop: 07/08/18 09:16


Acetaminophen/Hydrocodone Bitart (Norco 5mg/325mg)  1 tab PO Q6H PRN


   PRN Reason: Moderate to Severe Pain


   Stop: 07/07/18 23:38


   Last Admin: 05/10/18 08:29 Dose:  1 tab


Gabapentin (Neurontin)  100 mg PO TID [facility]


   Stop: 07/07/18 23:44


   Last Admin: 05/11/18 09:28 Dose:  100 mg


Magnesium Sulfate (Magnesium Sulfate Premix)  2 gm in 50 mls @ 25 mls/hr IV 

DAILY PRN


   PRN Reason: Magnesium level less than 1.6


   Stop: 07/08/18 09:16


Tigecycline 50 mg/ Sodium (Chloride)  100 mls @ 100 mls/hr IV Q12H [facility]


   Stop: 07/08/18 20:59


   Last Admin: 05/11/18 09:29 Dose:  100 mls/hr


Potassium Chloride (Potassium Chloride)  40 meq in 200 mls @ 50 mls/hr IV DAILY 

PRN


   PRN Reason: K LEVEL BELOW 3.2


   Stop: 07/09/18 09:59


Insulin Aspart (Novolog Insulin Sliding Scale)  0 units SUBQ ACHS [facility]


   PRN Reason: Protocol


   Stop: 07/08/18 07:29


   Last Admin: 05/11/18 08:08 Dose:  Not Given


Lactobacillus Rhamnosus (Culturelle 15b)  1 each PO DAILY [facility]


   Stop: 07/09/18 08:59


   Last Admin: 05/11/18 09:28 Dose:  1 each


Lorazepam (Ativan)  1 mg IVP Q4HR PRN; Protocol


   PRN Reason: Anxiety


   Stop: 07/08/18 09:16


Magnesium Oxide (Mag-Oxide)  400 mg PO BID PRN


   PRN Reason: Mg less than 1.9


   Stop: 07/08/18 09:16


Metoclopramide HCl (Reglan)  5 mg PO Q8HR [facility]


   Stop: 07/10/18 09:29


   Last Admin: 05/11/18 09:57 Dose:  5 mg


Morphine Sulfate (Morphine)  1 mg IVP Q4HR PRN


   PRN Reason: Severe Pain


   Stop: 07/08/18 09:16


   Last Admin: 05/11/18 06:29 Dose:  1 mg


Ondansetron HCl (Zofran)  4 mg IVP Q6H PRN


   PRN Reason: Nausea / Vomiting


   Stop: 07/08/18 09:16


   Last Admin: 05/10/18 14:14 Dose:  4 mg


Potassium Chloride (Klor-Con)  40 meq PO DAILY PRN


   PRN Reason: k level less than 3.5


   Stop: 07/08/18 09:16


Sertraline HCl (Zoloft)  25 mg PO DAILY [facility]


   PRN Reason: Protocol


   Stop: 07/09/18 08:59


   Last Admin: 05/11/18 09:29 Dose:  25 mg


Zolpidem Tartrate (Ambien)  10 mg PO HS PRN


   PRN Reason: Insomnia


   Stop: 07/07/18 23:42


   Last Admin: 05/10/18 21:02 Dose:  10 mg


Zolpidem Tartrate (Ambien)  10 mg PO HS PRN


   PRN Reason: Insomnia


   Stop: 07/08/18 09:16








General: no acute distress, well developed, well nourished


HEENT: atraumatic, normocephalic, PERRLA, EOMI


Neck: supple, no thyromegaly, no lymphadenopathy


Cardiovascular: S1S2, regular


Lungs: clear to auscultation bilaterally, clear to percussion


Abdomen: soft, no tender, no distended, no mass


Extremities: other (Right TMA wound open.), no cyanosis, no clubbing, no edema


Neurological: awake, alert, oriented


Skin: intact





Infectious Disease Assmt/Plan





- Assessment


Assessment: 





1.  Right TMA wound infection.  Wound culture grew MDR Acinetobacter baumenii 

complex and Klebsiella pneumoniae


2.   Osteomyelitis of the right foot.


3.   Diabetes mellitus type 2.


4.  Diabetic neuropathy.


5.  PAD.














- Plan


Plan: 





Change antibiotic to Tygacil for 6 weeks.


Wound care.

## 2018-05-11 NOTE — GENERAL PROGRESS NOTE
Subjective





- Review of Systems


Service Date: 05/11/18


Subjective: 





Pt seen and evaluated. On IV Amikacin. No n,v,d or cp. No falls or sz.


Pain in controlled.


Status post venous doppler US of LE.


No dysuria.


Pt being very non complaint with care.





Objective





- Results


Result Diagrams: 


 05/09/18 05:35





 05/09/18 05:35


Recent Labs: 


 Laboratory Last Values











WBC  6.9 Th/cmm (4.8-10.8)   05/09/18  05:35    


 


RBC  3.11 Mil/cmm (4.30-5.70)  L  05/09/18  05:35    


 


Hgb  8.4 gm/dL (12-16)  L  05/09/18  05:35    


 


Hct  25.6 % (41.0-60)  L  05/09/18  05:35    


 


MCV  82.2 fl (80-99)   05/09/18  05:35    


 


MCH  27.2 pg (26.0-30.0)   05/09/18  05:35    


 


MCHC Differential  33.0 pg (28.0-36.0)   05/09/18  05:35    


 


RDW  13.1 % (11.5-20.0)   05/09/18  05:35    


 


Plt Count  299 Th/cmm (150-400)   05/09/18  05:35    


 


MPV  6.8 fl  05/09/18  05:35    


 


Neutrophils %  62.6 % (40.0-80.0)   05/09/18  05:35    


 


Lymphocytes %  24.8 % (20.0-50.0)   05/09/18  05:35    


 


Monocytes %  7.8 % (2.0-10.0)   05/09/18  05:35    


 


Eosinophils %  3.9 % (0.0-5.0)   05/09/18  05:35    


 


Basophils %  0.9 % (0.0-2.0)   05/09/18  05:35    


 


PT  12.5 SECONDS (9.5-11.5)  H  05/08/18  17:08    


 


INR  1.19  (0.5-1.4)   05/08/18  17:08    


 


Sodium  133 mEq/L (136-145)  L  05/09/18  05:35    


 


Potassium  4.6 mEq/L (3.5-5.1)   05/09/18  05:35    


 


Chloride  102 mEq/L ()   05/09/18  05:35    


 


Carbon Dioxide  25.8 mEq/L (21.0-31.0)   05/09/18  05:35    


 


Anion Gap  9.8  (7.0-16.0)   05/09/18  05:35    


 


BUN  26 mg/dL (7-25)  H  05/09/18  05:35    


 


Creatinine  1.6 mg/dL (0.7-1.3)  H  05/09/18  05:35    


 


Est GFR ( Amer)  58.0 ml/min (>90)   05/09/18  05:35    


 


Est GFR (Non-Af Amer)  47.9 ml/min  05/09/18  05:35    


 


BUN/Creatinine Ratio  16.3   05/09/18  05:35    


 


Glucose  93 mg/dL ()   05/09/18  05:35    


 


POC Glucose  94 MG/DL (70 - 105)   05/11/18  06:33    


 


Hemoglobin A1c %  5.5 % (4.0-6.0)   05/08/18  17:08    


 


Whole Bld Lactic Acid  0.84 mmol/L (0.60-1.99)   05/08/18  17:15    


 


Calcium  8.6 mg/dL (8.6-10.3)   05/09/18  05:35    


 


Magnesium  2.1 mg/dL (1.9-2.7)   05/09/18  05:35    


 


Total Bilirubin  0.2 mg/dL (0.3-1.0)  L  05/09/18  05:35    


 


Direct Bilirubin  0.04 mg/dL (0.0-0.2)   05/08/18  17:08    


 


AST  13 U/L (13-39)   05/09/18  05:35    


 


ALT  9 U/L (7-52)   05/09/18  05:35    


 


Alkaline Phosphatase  85 U/L ()   05/09/18  05:35    


 


Troponin I  0.03 ng/mL (0.01-0.05)   05/08/18  17:08    


 


C-Reactive Protein  5.6 mg/dL (0.0-0.9)  H  05/08/18  17:08    


 


B-Natriuretic Peptide  240.0 pg/mL (5.0-100.0)  H  05/08/18  17:08    


 


Total Protein  7.0 gm/dL (6.0-8.3)   05/09/18  05:35    


 


Albumin  3.0 gm/dL (4.2-5.5)  L  05/09/18  05:35    


 


Globulin  4.0 gm/dL  05/09/18  05:35    


 


Albumin/Globulin Ratio  0.8  (1.0-1.8)  L  05/09/18  05:35    


 


Triglycerides  175 mg/dL (<150)  H  05/08/18  17:15    


 


Cholesterol  182 mg/dL (<200)   05/08/18  17:15    


 


LDL Cholesterol Direct  125 mg/dL ()   05/08/18  17:15    


 


HDL Cholesterol  20 mg/dL (23-92)  L  05/08/18  17:15    


 


Lipase  34 U/L (11-82)   05/08/18  17:15    


 


Vitamin C  0.3 mg/dL (0.2-2.0)   05/08/18  17:08    


 


TSH  0.78 uIU/ml (0.34-5.60)   05/08/18  17:08    














- Physical Exam


Vitals and I&O: 


 Vital Signs











Temp  98.7 F   05/11/18 04:00


 


Pulse  86   05/11/18 04:00


 


Resp  16   05/11/18 08:00


 


BP  136/74   05/11/18 04:00


 


Pulse Ox  98   05/10/18 08:00








 Intake & Output











 05/10/18 05/11/18 05/11/18





 18:59 06:59 18:59


 


Intake Total 100 600 


 


Output Total  0 


 


Balance 100 600 


 


Weight (lbs) 106.141 kg 106.141 kg 


 


Intake:   


 


  Intake, IV Amount 100  


 


    Tigecycline 50 mg In 100  





    Sodium Chloride 0.9% 100   





    ml @ 100 mls/hr IV Q12H   





    [facility] Rx#:[id number]   


 


  Oral  600 


 


Output:   


 


  Stool  0 


 


Other:   


 


  # Voids  3 


 


  # Bowel Movements  0 


 


  Weight Source Estimated Estimated 











Active Medications: 


Current Medications





Acetaminophen (Tylenol)  650 mg PO Q6H PRN


   PRN Reason: HEADACHE/TEMP ABOVE 100F


   Stop: 07/08/18 09:16


Acetaminophen/Hydrocodone Bitart (Norco 5mg/325mg)  1 tab PO Q6H PRN


   PRN Reason: Moderate to Severe Pain


   Stop: 07/07/18 23:38


   Last Admin: 05/10/18 08:29 Dose:  1 tab


Gabapentin (Neurontin)  100 mg PO TID [facility]


   Stop: 07/07/18 23:44


   Last Admin: 05/10/18 20:22 Dose:  100 mg


Magnesium Sulfate (Magnesium Sulfate Premix)  2 gm in 50 mls @ 25 mls/hr IV 

DAILY PRN


   PRN Reason: Magnesium level less than 1.6


   Stop: 07/08/18 09:16


Tigecycline 50 mg/ Sodium (Chloride)  100 mls @ 100 mls/hr IV Q12H [facility]


   Stop: 07/08/18 20:59


   Last Admin: 05/10/18 23:00 Dose:  100 mls/hr


Potassium Chloride (Potassium Chloride)  40 meq in 200 mls @ 50 mls/hr IV DAILY 

PRN


   PRN Reason: K LEVEL BELOW 3.2


   Stop: 07/09/18 09:59


Insulin Aspart (Novolog Insulin Sliding Scale)  0 units SUBQ ACHS [facility]A MARIA


   PRN Reason: Protocol


   Stop: 07/08/18 07:29


   Last Admin: 05/11/18 08:08 Dose:  Not Given


Lactobacillus Rhamnosus (Culturelle 15b)  1 each PO DAILY [facility]


   Stop: 07/09/18 08:59


   Last Admin: 05/10/18 08:29 Dose:  1 each


Lorazepam (Ativan)  1 mg IVP Q4HR PRN; Protocol


   PRN Reason: Anxiety


   Stop: 07/08/18 09:16


Magnesium Oxide (Mag-Oxide)  400 mg PO BID PRN


   PRN Reason: Mg less than 1.9


   Stop: 07/08/18 09:16


Morphine Sulfate (Morphine)  1 mg IVP Q4HR PRN


   PRN Reason: Severe Pain


   Stop: 07/08/18 09:16


   Last Admin: 05/11/18 06:29 Dose:  1 mg


Ondansetron HCl (Zofran)  4 mg IVP Q6H PRN


   PRN Reason: Nausea / Vomiting


   Stop: 07/08/18 09:16


   Last Admin: 05/10/18 14:14 Dose:  4 mg


Potassium Chloride (Klor-Con)  40 meq PO DAILY PRN


   PRN Reason: k level less than 3.5


   Stop: 07/08/18 09:16


Sertraline HCl (Zoloft)  25 mg PO DAILY ROSA MARIA


   PRN Reason: Protocol


   Stop: 07/09/18 08:59


   Last Admin: 05/10/18 08:29 Dose:  25 mg


Zolpidem Tartrate (Ambien)  10 mg PO HS PRN


   PRN Reason: Insomnia


   Stop: 07/07/18 23:42


   Last Admin: 05/10/18 21:02 Dose:  10 mg


Zolpidem Tartrate (Ambien)  10 mg PO HS PRN


   PRN Reason: Insomnia


   Stop: 07/08/18 09:16








General: Alert, Oriented x3, Cooperative


HEENT: Atraumatic, PERRLA, EOMI


Neck: Supple, no JVD, no Thyromegaly


Cardiovascular: Regular rate, Normal S1, Normal S2


Lungs: Clear to auscultation





Assessment/Plan





- Assessment


Assessment: 





MDRO R LE cellulitis/diabetic ulcer


DM


Ch Pain


VMN


Anemia of ch ill


Ch pain syn


Status post partial amputation of R foot








- Plan


Plan: 





Pt is on IV Amikacin


Bone scan has been ordered by ID to rule out osteomyelitis.


FU on results.


Status post arterial doppler of LE on 5/9/18-no occlusion.

## 2018-05-11 NOTE — DIAGNOSTIC IMAGING REPORT
Nuclear medicine 3 phase bone scan



History: Pain, right foot transmetatarsal amputation.  Rule out

osteomyelitis.



Comparison: X-rays of the right foot the same day



Technique/procedure: 25.9 mCi of technetium 99 MDP was administered

intravenously and flow, blood pool, and delayed images of the bilateral

lower extremities were obtained.



Findings:



Flow images demonstrate increased uptake seen along the distal stump of

the transmetatarsal amputation.  There is also increased uptake seen in

this region on blood pool images.  There is also mild increased uptake

seen along the ankle joint.  



Delayed images demonstrate increased uptake seen along the distal stump

of the transmetatarsal amputation.  Areas of mild increased uptake is

also noted along the ankle joint and posterior calcaneus.



IMPRESSION:



Increased uptake on flow, blood pool , and delayed images along the

distal stump of patient's transmetatarsal amputation.  This may be due

to underlying osteomyelitis.  Correlation should be made with clinical

findings.



Additional delayed uptake seen along the ankle joint and posterior

calcaneal region nonspecific.  Osteomyelitis of the first calcaneal

region cannot be completely excluded.  If necessary, MRI uptake may be

helpful.

## 2019-03-25 NOTE — INFECTIOUS DISEASE PROG NOTE
Infectious Disease Subjective





- Review of Systems


Service Date: 05/10/18


Subjective: 





There is no new change, no fever.





Infectious Disease Objective





- Results


Result Diagrams: 


 05/09/18 05:35





 05/09/18 05:35


Recent Labs: 


 Laboratory Last Values











WBC  6.9 Th/cmm (4.8-10.8)   05/09/18  05:35    


 


RBC  3.11 Mil/cmm (4.30-5.70)  L  05/09/18  05:35    


 


Hgb  8.4 gm/dL (12-16)  L  05/09/18  05:35    


 


Hct  25.6 % (41.0-60)  L  05/09/18  05:35    


 


MCV  82.2 fl (80-99)   05/09/18  05:35    


 


MCH  27.2 pg (26.0-30.0)   05/09/18  05:35    


 


MCHC Differential  33.0 pg (28.0-36.0)   05/09/18  05:35    


 


RDW  13.1 % (11.5-20.0)   05/09/18  05:35    


 


Plt Count  299 Th/cmm (150-400)   05/09/18  05:35    


 


MPV  6.8 fl  05/09/18  05:35    


 


Neutrophils %  62.6 % (40.0-80.0)   05/09/18  05:35    


 


Lymphocytes %  24.8 % (20.0-50.0)   05/09/18  05:35    


 


Monocytes %  7.8 % (2.0-10.0)   05/09/18  05:35    


 


Eosinophils %  3.9 % (0.0-5.0)   05/09/18  05:35    


 


Basophils %  0.9 % (0.0-2.0)   05/09/18  05:35    


 


PT  12.5 SECONDS (9.5-11.5)  H  05/08/18  17:08    


 


INR  1.19  (0.5-1.4)   05/08/18  17:08    


 


Sodium  133 mEq/L (136-145)  L  05/09/18  05:35    


 


Potassium  4.6 mEq/L (3.5-5.1)   05/09/18  05:35    


 


Chloride  102 mEq/L ()   05/09/18  05:35    


 


Carbon Dioxide  25.8 mEq/L (21.0-31.0)   05/09/18  05:35    


 


Anion Gap  9.8  (7.0-16.0)   05/09/18  05:35    


 


BUN  26 mg/dL (7-25)  H  05/09/18  05:35    


 


Creatinine  1.6 mg/dL (0.7-1.3)  H  05/09/18  05:35    


 


Est GFR ( Amer)  58.0 ml/min (>90)   05/09/18  05:35    


 


Est GFR (Non-Af Amer)  47.9 ml/min  05/09/18  05:35    


 


BUN/Creatinine Ratio  16.3   05/09/18  05:35    


 


Glucose  93 mg/dL ()   05/09/18  05:35    


 


POC Glucose  143 MG/DL (70 - 105)  H  05/10/18  05:57    


 


Whole Bld Lactic Acid  0.84 mmol/L (0.60-1.99)   05/08/18  17:15    


 


Calcium  8.6 mg/dL (8.6-10.3)   05/09/18  05:35    


 


Magnesium  2.1 mg/dL (1.9-2.7)   05/09/18  05:35    


 


Total Bilirubin  0.2 mg/dL (0.3-1.0)  L  05/09/18  05:35    


 


Direct Bilirubin  0.04 mg/dL (0.0-0.2)   05/08/18  17:08    


 


AST  13 U/L (13-39)   05/09/18  05:35    


 


ALT  9 U/L (7-52)   05/09/18  05:35    


 


Alkaline Phosphatase  85 U/L ()   05/09/18  05:35    


 


Troponin I  0.03 ng/mL (0.01-0.05)   05/08/18  17:08    


 


C-Reactive Protein  5.6 mg/dL (0.0-0.9)  H  05/08/18  17:08    


 


B-Natriuretic Peptide  240.0 pg/mL (5.0-100.0)  H  05/08/18  17:08    


 


Total Protein  7.0 gm/dL (6.0-8.3)   05/09/18  05:35    


 


Albumin  3.0 gm/dL (4.2-5.5)  L  05/09/18  05:35    


 


Globulin  4.0 gm/dL  05/09/18  05:35    


 


Albumin/Globulin Ratio  0.8  (1.0-1.8)  L  05/09/18  05:35    


 


Triglycerides  175 mg/dL (<150)  H  05/08/18  17:15    


 


Cholesterol  182 mg/dL (<200)   05/08/18  17:15    


 


LDL Cholesterol Direct  125 mg/dL ()   05/08/18  17:15    


 


HDL Cholesterol  20 mg/dL (23-92)  L  05/08/18  17:15    


 


Lipase  34 U/L (11-82)   05/08/18  17:15    


 


TSH  0.78 uIU/ml (0.34-5.60)   05/08/18  17:08    














- Physical Exam


Vitals and I&O: 


 Vital Signs











Temp  96.4 F   05/10/18 08:00


 


Pulse  76   05/10/18 08:00


 


Resp  17   05/10/18 08:00


 


BP  142/87   05/10/18 08:00


 


Pulse Ox  98   05/10/18 08:00








 Intake & Output











 05/09/18 05/10/18 05/10/18





 18:59 06:59 18:59


 


Intake Total 300 350 


 


Balance 300 350 


 


Weight (lbs) 106.141 kg 106.141 kg 106.141 kg


 


Intake:   


 


  Intake, IV Amount  100 


 


    Tigecycline 50 mg In  100 





    Sodium Chloride 0.9% 100   





    ml @ 100 mls/hr IV Q12H   





    [facility] Rx#:[id number]   


 


  Oral 300 250 


 


Other:   


 


  # Voids 3 2 


 


  # Bowel Movements 1  


 


  Weight Source Bedscale Bedscale Estimated











Active Medications: 


Current Medications





Acetaminophen (Tylenol)  650 mg PO Q6H PRN


   PRN Reason: HEADACHE/TEMP ABOVE 100F


   Stop: 07/08/18 09:16


Acetaminophen/Hydrocodone Bitart (Norco 5mg/325mg)  1 tab PO Q6H PRN


   PRN Reason: Moderate to Severe Pain


   Stop: 07/07/18 23:38


   Last Admin: 05/10/18 08:29 Dose:  1 tab


Gabapentin (Neurontin)  100 mg PO TID [facility]


   Stop: 07/07/18 23:44


   Last Admin: 05/10/18 08:29 Dose:  100 mg


Magnesium Sulfate (Magnesium Sulfate Premix)  2 gm in 50 mls @ 25 mls/hr IV 

DAILY PRN


   PRN Reason: Magnesium level less than 1.6


   Stop: 07/08/18 09:16


Tigecycline 50 mg/ Sodium (Chloride)  100 mls @ 100 mls/hr IV Q12H ROSA MARIA


   Stop: 07/08/18 20:59


   Last Admin: 05/10/18 08:28 Dose:  100 mls/hr


Potassium Chloride (Potassium Chloride)  40 meq in 200 mls @ 50 mls/hr IV DAILY 

PRN


   PRN Reason: K LEVEL BELOW 3.2


   Stop: 07/09/18 09:59


Insulin Aspart (Novolog Insulin Sliding Scale)  0 units SUBQ ACHS ROSA MARIA


   PRN Reason: Protocol


   Stop: 07/08/18 07:29


   Last Admin: 05/09/18 21:15 Dose:  Not Given


Lactobacillus Rhamnosus (Culturelle 15b)  1 each PO DAILY ROSA MARIA


   Stop: 07/09/18 08:59


   Last Admin: 05/10/18 08:29 Dose:  1 each


Lorazepam (Ativan)  1 mg IVP Q4HR PRN; Protocol


   PRN Reason: Anxiety


   Stop: 07/08/18 09:16


Magnesium Oxide (Mag-Oxide)  400 mg PO BID PRN


   PRN Reason: Mg less than 1.9


   Stop: 07/08/18 09:16


Morphine Sulfate (Morphine)  1 mg IVP Q4HR PRN


   PRN Reason: Severe Pain


   Stop: 07/08/18 09:16


   Last Admin: 05/10/18 05:56 Dose:  1 mg


Ondansetron HCl (Zofran)  4 mg IVP Q6H PRN


   PRN Reason: Nausea / Vomiting


   Stop: 07/08/18 09:16


   Last Admin: 05/09/18 14:23 Dose:  4 mg


Potassium Chloride (Klor-Con)  40 meq PO DAILY PRN


   PRN Reason: k level less than 3.5


   Stop: 07/08/18 09:16


Sertraline HCl (Zoloft)  25 mg PO DAILY ROSA MARIA


   PRN Reason: Protocol


   Stop: 07/09/18 08:59


   Last Admin: 05/10/18 08:29 Dose:  25 mg


Zolpidem Tartrate (Ambien)  10 mg PO HS PRN


   PRN Reason: Insomnia


   Stop: 07/07/18 23:42


   Last Admin: 05/09/18 20:57 Dose:  10 mg


Zolpidem Tartrate (Ambien)  10 mg PO HS PRN


   PRN Reason: Insomnia


   Stop: 07/08/18 09:16








General: no acute distress, well developed, well nourished


HEENT: atraumatic, normocephalic, PERRLA, EOMI


Neck: supple, no thyromegaly


Cardiovascular: S1S2, regular


Lungs: clear to auscultation bilaterally, clear to percussion


Abdomen: soft, no tender, no distended


Extremities: other (Right TMA wound.), no cyanosis, no clubbing, no edema





Infectious Disease Assmt/Plan





- Assessment


Assessment: 





1.  Right TMA wound infection.  Wound culture grew MDR Acinetobacter baumenii 

complex and Klebsiella pneumoniae


2.  Diabetes mellitus type 2.


3.  Diabetic neuropathy.


4.  Rule out PAD.

















- Plan


Plan: 





Change antibiotic to Tygacil.


Wound care.


ESR CRP


Three-phase bone scan Patient is not sure if Dr Magaña wants to check his A1C prior to another refill? Please advise.         Medication: Metformin    Provider: Sarthak Magaña MD  [facility]:  [phone] (mobile)    Who will be picking up: Patient    Advised patient that the nurse will call if there is a problem with the refill. Patient advised to allow 48/72 hours for the refill completion.